# Patient Record
Sex: MALE | Race: WHITE | Employment: OTHER | URBAN - METROPOLITAN AREA
[De-identification: names, ages, dates, MRNs, and addresses within clinical notes are randomized per-mention and may not be internally consistent; named-entity substitution may affect disease eponyms.]

---

## 2020-07-29 ENCOUNTER — HOSPITAL ENCOUNTER (EMERGENCY)
Facility: CLINIC | Age: 75
Discharge: HOME | End: 2020-07-29
Attending: EMERGENCY MEDICINE | Admitting: EMERGENCY MEDICINE
Payer: MEDICARE

## 2020-07-29 VITALS
HEART RATE: 77 BPM | SYSTOLIC BLOOD PRESSURE: 125 MMHG | RESPIRATION RATE: 18 BRPM | DIASTOLIC BLOOD PRESSURE: 90 MMHG | TEMPERATURE: 96.6 F | OXYGEN SATURATION: 94 %

## 2020-07-29 DIAGNOSIS — B02.9 HERPES ZOSTER WITHOUT COMPLICATION: ICD-10-CM

## 2020-07-29 DIAGNOSIS — R42 LIGHTHEADEDNESS: ICD-10-CM

## 2020-07-29 LAB
ANION GAP SERPL CALCULATED.3IONS-SCNC: 5 MMOL/L (ref 3–14)
BASOPHILS # BLD AUTO: 0.1 10E9/L (ref 0–0.2)
BASOPHILS NFR BLD AUTO: 1 %
BUN SERPL-MCNC: 19 MG/DL (ref 7–30)
CALCIUM SERPL-MCNC: 8.6 MG/DL (ref 8.5–10.1)
CHLORIDE SERPL-SCNC: 108 MMOL/L (ref 94–109)
CO2 SERPL-SCNC: 26 MMOL/L (ref 20–32)
CREAT SERPL-MCNC: 1.17 MG/DL (ref 0.66–1.25)
DIFFERENTIAL METHOD BLD: ABNORMAL
EOSINOPHIL # BLD AUTO: 0.1 10E9/L (ref 0–0.7)
EOSINOPHIL NFR BLD AUTO: 2.3 %
ERYTHROCYTE [DISTWIDTH] IN BLOOD BY AUTOMATED COUNT: 13.9 % (ref 10–15)
GFR SERPL CREATININE-BSD FRML MDRD: 61 ML/MIN/{1.73_M2}
GLUCOSE SERPL-MCNC: 118 MG/DL (ref 70–99)
HCT VFR BLD AUTO: 48.1 % (ref 40–53)
HGB BLD-MCNC: 15.5 G/DL (ref 13.3–17.7)
IMM GRANULOCYTES # BLD: 0 10E9/L (ref 0–0.4)
IMM GRANULOCYTES NFR BLD: 0.2 %
INTERPRETATION ECG - MUSE: NORMAL
LYMPHOCYTES # BLD AUTO: 1.8 10E9/L (ref 0.8–5.3)
LYMPHOCYTES NFR BLD AUTO: 31.5 %
MCH RBC QN AUTO: 32.8 PG (ref 26.5–33)
MCHC RBC AUTO-ENTMCNC: 32.2 G/DL (ref 31.5–36.5)
MCV RBC AUTO: 102 FL (ref 78–100)
MONOCYTES # BLD AUTO: 0.6 10E9/L (ref 0–1.3)
MONOCYTES NFR BLD AUTO: 11.1 %
NEUTROPHILS # BLD AUTO: 3.1 10E9/L (ref 1.6–8.3)
NEUTROPHILS NFR BLD AUTO: 53.9 %
NRBC # BLD AUTO: 0 10*3/UL
NRBC BLD AUTO-RTO: 0 /100
PLATELET # BLD AUTO: 149 10E9/L (ref 150–450)
POTASSIUM SERPL-SCNC: 3.9 MMOL/L (ref 3.4–5.3)
RBC # BLD AUTO: 4.73 10E12/L (ref 4.4–5.9)
SODIUM SERPL-SCNC: 139 MMOL/L (ref 133–144)
TROPONIN I SERPL-MCNC: <0.015 UG/L (ref 0–0.04)
TROPONIN I SERPL-MCNC: <0.015 UG/L (ref 0–0.04)
WBC # BLD AUTO: 5.7 10E9/L (ref 4–11)

## 2020-07-29 PROCEDURE — 93005 ELECTROCARDIOGRAM TRACING: CPT

## 2020-07-29 PROCEDURE — 25800030 ZZH RX IP 258 OP 636: Performed by: EMERGENCY MEDICINE

## 2020-07-29 PROCEDURE — 96360 HYDRATION IV INFUSION INIT: CPT

## 2020-07-29 PROCEDURE — 84484 ASSAY OF TROPONIN QUANT: CPT | Mod: 91 | Performed by: EMERGENCY MEDICINE

## 2020-07-29 PROCEDURE — 99285 EMERGENCY DEPT VISIT HI MDM: CPT | Mod: 25

## 2020-07-29 PROCEDURE — 85025 COMPLETE CBC W/AUTO DIFF WBC: CPT | Performed by: EMERGENCY MEDICINE

## 2020-07-29 PROCEDURE — 80048 BASIC METABOLIC PNL TOTAL CA: CPT | Performed by: EMERGENCY MEDICINE

## 2020-07-29 RX ORDER — VALACYCLOVIR HYDROCHLORIDE 1 G/1
1000 TABLET, FILM COATED ORAL 3 TIMES DAILY
Qty: 21 TABLET | Refills: 0 | Status: SHIPPED | OUTPATIENT
Start: 2020-07-29 | End: 2020-08-05

## 2020-07-29 RX ADMIN — SODIUM CHLORIDE 500 ML: 9 INJECTION, SOLUTION INTRAVENOUS at 09:31

## 2020-07-29 ASSESSMENT — ENCOUNTER SYMPTOMS
LIGHT-HEADEDNESS: 1
SHORTNESS OF BREATH: 0
PALPITATIONS: 0
WEAKNESS: 1
VOMITING: 0
DIARRHEA: 0
FEVER: 0
COUGH: 0

## 2020-07-29 NOTE — ED PROVIDER NOTES
History   Chief Complaint  Dizziness      HPI   Julio Kidd is a 75 year old male with a history of coronary artery disease status post RCA stent, hyperlipidemia, and hypertension, who presents to the emergency department for evaluation of episode of lightheadedness as well as rash to the left upper extremity.  Patient endorses rash to the left upper extremity for the past few days.  He thinks there might be some blisters developing.  No fever, cough, vomiting, diarrhea, chest pain, trouble breathing.  Patient does recall clearing some brush with the left arm within the past 1 to 2 weeks but the arm was covered at that time.  He does not recall any injuries or open wounds to the left upper extremity.  This morning around 8 AM while in the kitchen, he started to feel lightheaded and sat down.  He denies any palpitations or chest pain or shortness of breath at that time.  Episode lasted about 20 minutes.  He recalls a similar episode a few years ago and did not see a doctor at that time.  Currently he feels generally weak but otherwise not lightheaded.    Allergies  No Known Drug Allergies    Medications  Aspirin 81 mg  Atorvastatin  Lisinopril  Metoprolol succinate     Past Medical History  Coronary artery disease   Elevated PSA   Frequent PVCs  Hyperlipidemia  Hypertension  Ischemic cardiomyopathy  Myocardial infarction  Tubular adenoma of colon    Past Surgical History  Right coronary artery stent placement    Family History  Father - prostate cancer, heart disease, hypertension    Social History  Smoking Status: former smoker  Smokeless Tobacco: never  Alcohol Use: yes    Review of Systems   Constitutional: Negative for fever.   Respiratory: Negative for cough and shortness of breath.    Cardiovascular: Negative for chest pain and palpitations.   Gastrointestinal: Negative for diarrhea and vomiting.   Skin: Positive for rash.   Neurological: Positive for syncope, weakness and light-headedness.   All other  systems reviewed and are negative.    Physical Exam     Physical Exam    Patient Vitals for the past 24 hrs:   BP Temp Temp src Pulse Heart Rate Resp SpO2   07/29/20 1130 127/77 -- -- 59 58 19 95 %   07/29/20 1115 -- -- -- 68 79 22 96 %   07/29/20 1100 (!) 122/91 -- -- 60 60 13 97 %   07/29/20 1045 122/72 -- -- 58 52 20 96 %   07/29/20 1030 110/80 -- -- 56 57 19 97 %   07/29/20 1015 132/79 -- -- 57 63 17 97 %   07/29/20 1000 126/76 -- -- 57 65 18 97 %   07/29/20 0945 128/76 -- -- (!) 49 52 18 98 %   07/29/20 0930 126/80 -- -- 50 (!) 49 -- --   07/29/20 0910 107/74 96.6  F (35.9  C) Oral -- 50 18 98 %     VS: Reviewed per above  HENT: Mucous membranes moist, no nuchal rigidity  EYES: sclera anicteric  CV: Rate as noted, regular rhythm.   RESP: Effort normal. Breath sounds are normal bilaterally.  GI: no tenderness/rebound/guarding, not distended.  NEURO: GCS 15, cranial nerves II through XII are intact, 5 out of 5 strength in all 4 extremities, sensation is intact light touch in all 4 extremities  MSK: No deformity of the extremities  SKIN: Warm and dry      Emergency Department Course   ECG:  ECG taken at 0929, ECG read at 0930  Sinus bradycardia  Right bundle branch block  Inferior infarct (cited on or before 24-OCT-2005)  Abnormal ECG  When compared with ECG of 10/05/2005, Right bundle branch block is now Present  Rate 47 bpm. VA interval 184 ms. QRS duration 164 ms. QT/QTc 498/440 ms. P-R-T axes 32 94 -28.    Laboratory:  Laboratory findings were communicated with the patient who voiced understanding of the findings.    Troponin(0932): <0.015   Troponin(1142): <0.015     CBC: WBC: 5.7, HGB: 15.5, PLT: 149 (L)    BMP: Glucose 118 (H), o/w WNL (Creatinine: 1.17)    Interventions:  1031 NS 1L IV     Emergency Department Course:  Past medical records, nursing notes, and vitals reviewed.    0919 I performed an exam of the patient as documented above.     0929 EKG obtained in the ED, see results above.     0932 IV was  inserted and blood was drawn for laboratory testing, results above.    1220 I rechecked the patient and discussed the results of the workup thus far.     Findings and plan explained to the Patient. Patient discharged home with instructions regarding supportive care, medications, and reasons to return. The importance of close follow-up was reviewed. The patient was prescribed Valtrex.     I personally reviewed the laboratory and imaging results with the Patient and answered all related questions prior to discharge.     Impression & Plan     Medical Decision Making:  Patient presents to the ER for evaluation of episode of lightheadedness this morning as well as rash to left upper extremity.  On arrival vital signs within normal limits.  On exam he has dermatomal rash in the left upper extremity with early vesicles.  This is concerning for shingles.  No open wounds or evidence of superimposed bacterial infection.  Neuro exam is reassuring without signs of suggest occult stroke or intracranial hemorrhage.  EKG reveals sinus bradycardia without ischemic change compared to prior pewter interpretations viewable in care everywhere.  Delta troponin testing is negative and he had never had any chest pain to suggest recurrent MI.  After IV fluids and some p.o. intake in the ER, he was feeling better.  I offered ZIO Patch to monitor for occult dysrhythmia but patient declined.  He was discharged with valacyclovir with plans for primary care follow-up.  Return precautions were discussed prior to discharge.    Diagnosis:    ICD-10-CM    1. Lightheadedness  R42    2. Herpes zoster without complication  B02.9        Disposition:  Discharged to home.    Discharge Medications:  New Prescriptions    VALACYCLOVIR (VALTREX) 1000 MG TABLET    Take 1 tablet (1,000 mg) by mouth 3 times daily for 7 days       Scribe Disclosure:  Tiarra MOURA, am serving as a scribe on 7/29/2020 at 9:20 AM to personally document services performed by  Julian Monson MD based on my observations and the provider's statements to me.      Julian Monson MD  07/29/20 [id number]

## 2020-07-29 NOTE — ED TRIAGE NOTES
Patient presents with lightheadedness/dizziness, near syncopal episode. Patient was calling PCP to get an appointment for a rash to his left arm when he started to feel lightheaded. He did not pass out. No chest pain or shortness of breath. He has had the rash since Monday. Of note, he was at a party about 10 days ago where 3 people tested positive for COVID. History of stents. ABCDS intact, alert and oriented x 4.

## 2020-07-29 NOTE — ED AVS SNAPSHOT
[facility] Emergency Department  [address]  [facility] [address]  Phone:  [phone]  Fax:  [phone]                                    Julio Kidd   MRN: [id number]    Department:  [facility] Emergency Department   Date of Visit:  7/29/2020           After Visit Summary Signature Page    I have received my discharge instructions, and my questions have been answered. I have discussed any challenges I see with this plan with the nurse or doctor.    ..........................................................................................................................................  Patient/Patient Representative Signature      ..........................................................................................................................................  Patient Representative Print Name and Relationship to Patient    ..................................................               ................................................  Date                                   Time    ..........................................................................................................................................  Reviewed by Signature/Title    ...................................................              ..............................................  Date                                               Time          22EPIC Rev 08/18